# Patient Record
Sex: FEMALE | Race: WHITE | NOT HISPANIC OR LATINO | URBAN - NONMETROPOLITAN AREA
[De-identification: names, ages, dates, MRNs, and addresses within clinical notes are randomized per-mention and may not be internally consistent; named-entity substitution may affect disease eponyms.]

---

## 2024-12-05 ENCOUNTER — APPOINTMENT (OUTPATIENT)
Dept: CARDIOLOGY | Facility: CLINIC | Age: 49
End: 2024-12-05
Payer: COMMERCIAL

## 2024-12-13 ENCOUNTER — APPOINTMENT (OUTPATIENT)
Dept: CARDIOLOGY | Facility: CLINIC | Age: 49
End: 2024-12-13
Payer: COMMERCIAL

## 2024-12-13 VITALS
HEIGHT: 69 IN | HEART RATE: 47 BPM | BODY MASS INDEX: 42.95 KG/M2 | WEIGHT: 290 LBS | SYSTOLIC BLOOD PRESSURE: 106 MMHG | DIASTOLIC BLOOD PRESSURE: 70 MMHG

## 2024-12-13 DIAGNOSIS — R00.1 SINUS BRADYCARDIA: ICD-10-CM

## 2024-12-13 DIAGNOSIS — I47.10 SVT (SUPRAVENTRICULAR TACHYCARDIA) (CMS-HCC): ICD-10-CM

## 2024-12-13 DIAGNOSIS — Z87.891 FORMER SMOKER: ICD-10-CM

## 2024-12-13 PROCEDURE — 99204 OFFICE O/P NEW MOD 45 MIN: CPT | Performed by: INTERNAL MEDICINE

## 2024-12-13 PROCEDURE — 3008F BODY MASS INDEX DOCD: CPT | Performed by: INTERNAL MEDICINE

## 2024-12-13 PROCEDURE — 93000 ELECTROCARDIOGRAM COMPLETE: CPT | Performed by: INTERNAL MEDICINE

## 2024-12-13 PROCEDURE — 1036F TOBACCO NON-USER: CPT | Performed by: INTERNAL MEDICINE

## 2024-12-13 RX ORDER — LANOLIN ALCOHOL/MO/W.PET/CERES
1000 CREAM (GRAM) TOPICAL
COMMUNITY
Start: 2024-03-27

## 2024-12-13 RX ORDER — FAMOTIDINE 20 MG/1
20 TABLET, FILM COATED ORAL 2 TIMES DAILY
COMMUNITY
Start: 2024-12-03

## 2024-12-13 RX ORDER — LEVOTHYROXINE SODIUM 25 UG/1
25 TABLET ORAL
COMMUNITY
Start: 2024-09-30

## 2024-12-13 RX ORDER — ATENOLOL 25 MG/1
25 TABLET ORAL
COMMUNITY
Start: 2024-12-03

## 2024-12-13 RX ORDER — VIT C/E/ZN/COPPR/LUTEIN/ZEAXAN 250MG-90MG
2 CAPSULE ORAL DAILY
COMMUNITY
Start: 2023-10-03

## 2024-12-13 RX ORDER — LORATADINE 10 MG/1
10 TABLET ORAL
COMMUNITY
Start: 2024-12-03

## 2024-12-13 ASSESSMENT — ENCOUNTER SYMPTOMS
SHORTNESS OF BREATH: 1
PALPITATIONS: 1
DIZZINESS: 1

## 2024-12-13 NOTE — LETTER
December 13, 2024     Tyrone Peck DO  [address], [address]  [facility], [place] [address]    Patient: Miguel Whaley   YOB: 1975   Date of Visit: 12/13/2024       Dear Dr. Tyrone Peck DO:    Thank you for referring Miguel Whaley to me for evaluation. Below are my notes for this consultation.  If you have questions, please do not hesitate to call me. I look forward to following your patient along with you.       Sincerely,     Julia Miranda MD      CC: No Recipients  ______________________________________________________________________________________    Cardiology Consultation- New Consult    Reason for referral: Patient is here for cardiac vascular evaluation for SVT and palpitation    HPI: Miguel Whaley is a 49 y.o. female well-known to me.  I have seen her for years ago for similar situation.  She had a history of documented SVT with tendency for bradycardia when she is in sinus rhythm even on low-dose beta-blocker.  She underwent an attempt to do ablation but it was aborted because of difficult vascular access and difficulty sedating the patient.  Over the last few years the patient report her symptoms is reasonably controlled on atenolol 50 mg daily however recently she has noticed her heart rate is in the mid to upper 30s and was feeling weak and fatigue so she self cut down the dose to have.  She is currently on atenolol 25 mg daily her heart rate in the upper 40s low 50s.  She continued to experience episode of palpitation intermittently.  She denies any lightheadedness, dizziness or syncope.    Assessment    1.  History of documented supraventricular tachycardia with tendency for bradycardia with mild tachybradycardia syndrome she recently reduced the dose of atenolol and noticed slight increase of her palpitation and tachycardia  2.  Morbid obesity  3.  Sinus bradycardia  4.  Hypothyroidism on replacement therapy    Plan    1.  I had lengthy discussion with the patient  regarding treatment option.  We discussed either to continue present medical regimen for a while and see and reassess her response in few month, versus switching her to antiarrhythmic and I suggested Rythmol considering it does not cause bradycardia versus referral again for an ablation at tertiary [facility] hospital.  The option discussed with her at great length.  For the time being she elected to remain on atenolol but will notify me if she has a change in heart  2.  We discussed risk factor modification and the importance of weight loss, exercise and dietary modification  3.  Follow-up in 3 to 4 months      Past Medical History:   She has no past medical history on file.    Surgical History:   She has a past surgical history that includes Continent appendicostomy; Hysterectomy; Ablation of dysrhythmic focus; and Back surgery.    Family History:   Family History   Problem Relation Name Age of Onset   • Aneurysm Mother     • Stroke Mother     • Hypotension Father     • Bipolar disorder Father     • Cancer Father     • Stroke Sister     • Stroke Brother     • Cancer Maternal Grandmother     • Diabetes type I Maternal Grandmother     • Cancer Paternal Grandmother     • Diabetes type I Paternal Grandmother     • Heart failure Other         Social History:   Social History     Tobacco Use   • Smoking status: Former     Types: Cigarettes   • Smokeless tobacco: Never   Substance Use Topics   • Alcohol use: Yes     Comment: occ        Allergies:  Gabapentin     Current Medications:    Current Outpatient Medications:   •  atenolol (Tenormin) 25 mg tablet, Take 1 tablet (25 mg) by mouth once daily., Disp: , Rfl:   •  cholecalciferol (Vitamin D-3) 125 mcg (5000 UT) capsule, Take 2 capsules (250 mcg) by mouth once daily., Disp: , Rfl:   •  cyanocobalamin (Vitamin B-12) 1,000 mcg tablet, Take 1 tablet (1,000 mcg) by mouth once daily., Disp: , Rfl:   •  famotidine (Pepcid) 20 mg tablet, Take 1 tablet (20 mg) by mouth twice a day.,  "Disp: , Rfl:   •  levothyroxine (Synthroid, Levoxyl) 25 mcg tablet, Take 1 tablet (25 mcg) by mouth once daily., Disp: , Rfl:   •  loratadine (Claritin) 10 mg tablet, Take 1 tablet (10 mg) by mouth once daily., Disp: , Rfl:      Vitals:  Vitals:    12/13/24 0951 12/13/24 0952   BP: 108/74 106/70   BP Location: Left arm Right arm   Patient Position: Sitting Sitting   Pulse: (!) 47    Weight: 132 kg (290 lb)    Height: 1.753 m (5' 9\")           Review of Systems   Cardiovascular:  Positive for palpitations.   Respiratory:  Positive for shortness of breath.    Neurological:  Positive for dizziness.   All other systems reviewed and are negative.      Objective        Physical Exam  Constitutional:       Appearance: Normal appearance.   HENT:      Nose: Nose normal.   Neck:      Vascular: No carotid bruit.   Cardiovascular:      Rate and Rhythm: Normal rate.      Pulses: Normal pulses.      Heart sounds: Normal heart sounds.   Pulmonary:      Effort: Pulmonary effort is normal.   Abdominal:      General: Bowel sounds are normal.      Palpations: Abdomen is soft.   Musculoskeletal:         General: Normal range of motion.      Cervical back: Normal range of motion.      Right lower leg: No edema.      Left lower leg: No edema.   Skin:     General: Skin is warm and dry.   Neurological:      General: No focal deficit present.      Mental Status: She is alert.   Psychiatric:         Mood and Affect: Mood normal.         Behavior: Behavior normal.         Thought Content: Thought content normal.         Judgment: Judgment normal.                Assessment and Plan:   1. SVT (supraventricular tachycardia) (CMS-HCC)  Follow Up In Cardiology    ECG 12 Lead      2. Sinus bradycardia  ECG 12 Lead      3. BMI 40.0-44.9, adult (Multi)        4. Former smoker               Scribe Attestation  By signing my name below, Krista STONE LPN   , Scribe   attest that this documentation has been prepared under the direction and in the presence " of Julia Miranda MD.    Provider Attestation - Scribe documentation    All medical record entries made by the Scribe were at my direction and personally dictated by me. I have reviewed the chart and agree that the record accurately reflects my personal performance of the history, physical exam, discussion and plan.

## 2024-12-13 NOTE — PATIENT INSTRUCTIONS
Please bring all medicines, vitamins, and herbal supplements with you when you come to the office.    Prescriptions will not be filled unless you are compliant with your follow up appointments or have a follow up appointment scheduled as per instruction of your physician. Refills should be requested at the time of your visit.     BMI was above normal measurement. Current weight: 132 kg (290 lb)  Weight change since last visit (-) denotes wt loss 12.88 lbs   Weight loss needed to achieve BMI 25: 121.1 Lbs  Weight loss needed to achieve BMI 30: 87.3 Lbs  Provided instructions on dietary changes  Provided instructions on exercise.    Ablation discussed  Follow up

## 2024-12-13 NOTE — PROGRESS NOTES
Cardiology Consultation- New Consult    Reason for referral: Patient is here for cardiac vascular evaluation for SVT and palpitation    HPI: Miguel Whaley is a 49 y.o. female well-known to me.  I have seen her for years ago for similar situation.  She had a history of documented SVT with tendency for bradycardia when she is in sinus rhythm even on low-dose beta-blocker.  She underwent an attempt to do ablation but it was aborted because of difficult vascular access and difficulty sedating the patient.  Over the last few years the patient report her symptoms is reasonably controlled on atenolol 50 mg daily however recently she has noticed her heart rate is in the mid to upper 30s and was feeling weak and fatigue so she self cut down the dose to have.  She is currently on atenolol 25 mg daily her heart rate in the upper 40s low 50s.  She continued to experience episode of palpitation intermittently.  She denies any lightheadedness, dizziness or syncope.    Assessment    1.  History of documented supraventricular tachycardia with tendency for bradycardia with mild tachybradycardia syndrome she recently reduced the dose of atenolol and noticed slight increase of her palpitation and tachycardia  2.  Morbid obesity  3.  Sinus bradycardia  4.  Hypothyroidism on replacement therapy    Plan    1.  I had lengthy discussion with the patient regarding treatment option.  We discussed either to continue present medical regimen for a while and see and reassess her response in few month, versus switching her to antiarrhythmic and I suggested Rythmol considering it does not cause bradycardia versus referral again for an ablation at tertiary care hospital.  The option discussed with her at great length.  For the time being she elected to remain on atenolol but will notify me if she has a change in heart  2.  We discussed risk factor modification and the importance of weight loss, exercise and dietary modification  3.  Follow-up in 3  "to 4 months      Past Medical History:   She has no past medical history on file.    Surgical History:   She has a past surgical history that includes Continent appendicostomy; Hysterectomy; Ablation of dysrhythmic focus; and Back surgery.    Family History:   Family History   Problem Relation Name Age of Onset    Aneurysm Mother      Stroke Mother      Hypotension Father      Bipolar disorder Father      Cancer Father      Stroke Sister      Stroke Brother      Cancer Maternal Grandmother      Diabetes type I Maternal Grandmother      Cancer Paternal Grandmother      Diabetes type I Paternal Grandmother      Heart failure Other         Social History:   Social History     Tobacco Use    Smoking status: Former     Types: Cigarettes    Smokeless tobacco: Never   Substance Use Topics    Alcohol use: Yes     Comment: occ        Allergies:  Gabapentin     Current Medications:    Current Outpatient Medications:     atenolol (Tenormin) 25 mg tablet, Take 1 tablet (25 mg) by mouth once daily., Disp: , Rfl:     cholecalciferol (Vitamin D-3) 125 mcg (5000 UT) capsule, Take 2 capsules (250 mcg) by mouth once daily., Disp: , Rfl:     cyanocobalamin (Vitamin B-12) 1,000 mcg tablet, Take 1 tablet (1,000 mcg) by mouth once daily., Disp: , Rfl:     famotidine (Pepcid) 20 mg tablet, Take 1 tablet (20 mg) by mouth twice a day., Disp: , Rfl:     levothyroxine (Synthroid, Levoxyl) 25 mcg tablet, Take 1 tablet (25 mcg) by mouth once daily., Disp: , Rfl:     loratadine (Claritin) 10 mg tablet, Take 1 tablet (10 mg) by mouth once daily., Disp: , Rfl:      Vitals:  Vitals:    12/13/24 0951 12/13/24 0952   BP: 108/74 106/70   BP Location: Left arm Right arm   Patient Position: Sitting Sitting   Pulse: (!) 47    Weight: 132 kg (290 lb)    Height: 1.753 m (5' 9\")           Review of Systems   Cardiovascular:  Positive for palpitations.   Respiratory:  Positive for shortness of breath.    Neurological:  Positive for dizziness.   All other " systems reviewed and are negative.      Objective         Physical Exam  Constitutional:       Appearance: Normal appearance.   HENT:      Nose: Nose normal.   Neck:      Vascular: No carotid bruit.   Cardiovascular:      Rate and Rhythm: Normal rate.      Pulses: Normal pulses.      Heart sounds: Normal heart sounds.   Pulmonary:      Effort: Pulmonary effort is normal.   Abdominal:      General: Bowel sounds are normal.      Palpations: Abdomen is soft.   Musculoskeletal:         General: Normal range of motion.      Cervical back: Normal range of motion.      Right lower leg: No edema.      Left lower leg: No edema.   Skin:     General: Skin is warm and dry.   Neurological:      General: No focal deficit present.      Mental Status: She is alert.   Psychiatric:         Mood and Affect: Mood normal.         Behavior: Behavior normal.         Thought Content: Thought content normal.         Judgment: Judgment normal.                Assessment and Plan:   1. SVT (supraventricular tachycardia) (CMS-HCC)  Follow Up In Cardiology    ECG 12 Lead      2. Sinus bradycardia  ECG 12 Lead      3. BMI 40.0-44.9, adult (Multi)        4. Former smoker               Scribe Attestation  By signing my name below, Krista STONE LPN, Scribe   attest that this documentation has been prepared under the direction and in the presence of Julia Miranda MD.    Provider Attestation - Scribe documentation    All medical record entries made by the Scribe were at my direction and personally dictated by me. I have reviewed the chart and agree that the record accurately reflects my personal performance of the history, physical exam, discussion and plan.

## 2025-03-04 ENCOUNTER — APPOINTMENT (OUTPATIENT)
Dept: CARDIOLOGY | Facility: CLINIC | Age: 50
End: 2025-03-04
Payer: COMMERCIAL